# Patient Record
Sex: MALE | Race: WHITE | HISPANIC OR LATINO | URBAN - METROPOLITAN AREA
[De-identification: names, ages, dates, MRNs, and addresses within clinical notes are randomized per-mention and may not be internally consistent; named-entity substitution may affect disease eponyms.]

---

## 2018-02-01 NOTE — PATIENT DISCUSSION
Reviewed OPTIONS including AVASTIN/EYLEA/LUCENTIS and patient wants to proceed with LUCENTIS treatment AND REPEAT IN 5 WKS THEN REEVAL  [address]

## 2018-02-01 NOTE — PROCEDURE NOTE: CLINICAL
PROCEDURE NOTE: Lucentis 0.5mg PFS OS. Diagnosis: Neovascular AMD with Active CNV. Anesthesia: Topical. Prep: Betadine Flush. Prior to injection, risks/benefits/alternatives discussed including but not limited to infection, loss of vision or eye, hemorrhage, cataract, glaucoma, retinal tears or detachment. The patient wished to proceed with treatment. Topical anesthesia was induced with Alcaine. Additional anesthesia was achieved using drop(s) or injection checked above. A drop of Povidone-iodine 5% ophthalmic solution was instilled over the injection site and in the inferior fornix. Betadine prep was performed. A single use prefilled syringe of intravitreal Lucentis 0.5mg/0.05ml was used and excess discarded. The needle was passed 3.0 mm posterior to the limbus in pseudophakic patients, and 3.5 mm posterior to the limbus in phakic patients. The remainder of the Lucentis 0.5mg in the single-use vial was then discarded in a medical waste disposal container. The eye was irrigated with sterile irrigating solution. Patient tolerated the procedure well. There were no complications. Post procedure instructions given. CF vision checked. Injection Time 12:20 PM. The patient was instructed to return for re-evaluation in approximately 4-12 weeks depending on his/her condition and was told to call immediately if vision decreases and/or if his/her eye becomes red, painful, and/or light sensitive. The patient was instructed to go to the emergency room or call 911 if unable to reach the doctor within an hour or two of trying or calling. Shanice Burden

## 2018-03-06 NOTE — PROCEDURE NOTE: CLINICAL
PROCEDURE NOTE: Lucentis 0.5mg PFS OS. Diagnosis: Neovascular AMD with Active CNV. Anesthesia: Akten Gel 3.5%. Prep: Betadine Flush. Prior to injection, risks/benefits/alternatives discussed including but not limited to infection, loss of vision or eye, hemorrhage, cataract, glaucoma, retinal tears or detachment. The patient wished to proceed with treatment. Topical anesthesia was induced with Alcaine. Additional anesthesia was achieved using drop(s) or injection checked above. A drop of Povidone-iodine 5% ophthalmic solution was instilled over the injection site and in the inferior fornix. Betadine prep was performed. A single use prefilled syringe of intravitreal Lucentis 0.5mg/0.05ml was used and excess discarded. The needle was passed 3.0 mm posterior to the limbus in pseudophakic patients, and 3.5 mm posterior to the limbus in phakic patients. The remainder of the Lucentis 0.5mg in the single-use vial was then discarded in a medical waste disposal container. The eye was irrigated with sterile irrigating solution. Patient tolerated the procedure well. There were no complications. Post procedure instructions given. CF vision checked. Injection Time 9:15 AM. The patient was instructed to return for re-evaluation in approximately 4-12 weeks depending on his/her condition and was told to call immediately if vision decreases and/or if his/her eye becomes red, painful, and/or light sensitive. The patient was instructed to go to the emergency room or call 911 if unable to reach the doctor within an hour or two of trying or calling. Caitlin Myers

## 2019-01-16 NOTE — PATIENT DISCUSSION
LAST AVASTIN TX WAS 12 6 18 WITH DR Patricia Dougherty [facility] - HAS BEEN RECEIVING TREATMENTS EVERY 5-6 WKS OVER SUMMER 2018.

## 2019-01-16 NOTE — PROCEDURE NOTE: CLINICAL
PROCEDURE NOTE: Lucentis 0.5mg PFS OS. Diagnosis: Neovascular AMD with Active CNV. Anesthesia: Topical. Prep: Betadine Flush. Prior to injection, risks/benefits/alternatives discussed including but not limited to infection, loss of vision or eye, hemorrhage, cataract, glaucoma, retinal tears or detachment. The patient wished to proceed with treatment. Topical anesthesia was induced with Alcaine. Additional anesthesia was achieved using drop(s) or injection checked above. A drop of Povidone-iodine 5% ophthalmic solution was instilled over the injection site and in the inferior fornix. Betadine prep was performed. A single use prefilled syringe of intravitreal Lucentis 0.5mg/0.05ml was used and excess discarded. The needle was passed 3.0 mm posterior to the limbus in pseudophakic patients, and 3.5 mm posterior to the limbus in phakic patients. The remainder of the Lucentis 0.5mg in the single-use vial was then discarded in a medical waste disposal container. The eye was irrigated with sterile irrigating solution. Patient tolerated the procedure well. There were no complications. Post procedure instructions given. CF vision checked. Injection Time *. The patient was instructed to return for re-evaluation in approximately 4-12 weeks depending on his/her condition and was told to call immediately if vision decreases and/or if his/her eye becomes red, painful, and/or light sensitive. The patient was instructed to go to the emergency room or call 911 if unable to reach the doctor within an hour or two of trying or calling. Lori Workman

## 2019-02-27 NOTE — PROCEDURE NOTE: CLINICAL
PROCEDURE NOTE: Lucentis 0.5mg PFS OS. Diagnosis: Neovascular AMD with Active CNV. Anesthesia: Topical. Prep: Betadine Flush. Prior to injection, risks/benefits/alternatives discussed including but not limited to infection, loss of vision or eye, hemorrhage, cataract, glaucoma, retinal tears or detachment. The patient wished to proceed with treatment. Topical anesthesia was induced with Alcaine. Additional anesthesia was achieved using drop(s) or injection checked above. A drop of Povidone-iodine 5% ophthalmic solution was instilled over the injection site and in the inferior fornix. Betadine prep was performed. A single use prefilled syringe of intravitreal Lucentis 0.5mg/0.05ml was used and excess discarded. The needle was passed 3.0 mm posterior to the limbus in pseudophakic patients, and 3.5 mm posterior to the limbus in phakic patients. The remainder of the Lucentis 0.5mg in the single-use vial was then discarded in a medical waste disposal container. The eye was irrigated with sterile irrigating solution. Patient tolerated the procedure well. There were no complications. Post procedure instructions given. CF vision checked. Injection Time *. The patient was instructed to return for re-evaluation in approximately 4-12 weeks depending on his/her condition and was told to call immediately if vision decreases and/or if his/her eye becomes red, painful, and/or light sensitive. The patient was instructed to go to the emergency room or call 911 if unable to reach the doctor within an hour or two of trying or calling. [facility]

## 2019-02-27 NOTE — PATIENT DISCUSSION
LAST AVASTIN TX WAS 12 6 18 WITH DR Leigha Marlow [facility] - HAS BEEN RECEIVING TREATMENTS EVERY 5-6 WKS OVER SUMMER 2018.

## 2020-01-22 NOTE — PATIENT DISCUSSION
LAST AVASTIN TX WAS 12 6 18 WITH DR Eugenio Renee [facility] - HAS BEEN RECEIVING TREATMENTS EVERY 5-6 WKS OVER SUMMER 2018.

## 2020-03-23 NOTE — PATIENT DISCUSSION
LAST AVASTIN TX WAS 12 6 18 WITH DR Melody Maradiaga [facility] - HAS BEEN RECEIVING TREATMENTS EVERY 5-6 WKS OVER SUMMER 2018.

## 2021-02-17 NOTE — PATIENT DISCUSSION
LAST AVASTIN TX WAS 12 6 18 WITH DR Toi Major [facility] - HAS BEEN RECEIVING TREATMENTS EVERY 5-6 WKS OVER SUMMER 2018.

## 2021-11-03 ENCOUNTER — NEW PATIENT (OUTPATIENT)
Dept: URBAN - METROPOLITAN AREA CLINIC 14 | Facility: CLINIC | Age: 76
End: 2021-11-03

## 2021-11-03 DIAGNOSIS — H02.834: ICD-10-CM

## 2021-11-03 DIAGNOSIS — H02.831: ICD-10-CM

## 2021-11-03 PROCEDURE — 92082 INTERMEDIATE VISUAL FIELD XM: CPT

## 2021-11-03 PROCEDURE — 99204 OFFICE O/P NEW MOD 45 MIN: CPT

## 2021-11-03 PROCEDURE — 92285 EXTERNAL OCULAR PHOTOGRAPHY: CPT

## 2021-11-03 ASSESSMENT — VISUAL ACUITY
OS_SC: 20/25
OD_SC: 20/25

## 2022-02-14 ENCOUNTER — POST-OP (OUTPATIENT)
Dept: URBAN - METROPOLITAN AREA CLINIC 15 | Facility: CLINIC | Age: 77
End: 2022-02-14

## 2022-02-14 DIAGNOSIS — H02.834: ICD-10-CM

## 2022-02-14 DIAGNOSIS — H02.831: ICD-10-CM

## 2022-02-14 DIAGNOSIS — Z98.890: ICD-10-CM

## 2022-02-14 PROCEDURE — 99024 POSTOP FOLLOW-UP VISIT: CPT

## 2022-02-14 ASSESSMENT — VISUAL ACUITY
OD_SC: 20/25
OS_SC: 20/25

## 2024-05-20 NOTE — PATIENT DISCUSSION
Problem: Pain  Goal: My pain/discomfort is manageable  Outcome: Not Progressing  Flowsheets (Taken 5/20/2024 1925)  Resident's pain/discomfort is manageable:   Offer non-pharmacological pain management interventions   Identify and avoid pain triggers       The clinical goals for the shift include Patient will not have any emesis by 5/21/24 at 2100.   Macula Risk DNA Test discussed and patient desires testing.